# Patient Record
Sex: MALE | Race: OTHER | HISPANIC OR LATINO
[De-identification: names, ages, dates, MRNs, and addresses within clinical notes are randomized per-mention and may not be internally consistent; named-entity substitution may affect disease eponyms.]

---

## 2022-08-16 ENCOUNTER — APPOINTMENT (OUTPATIENT)
Dept: WOUND CARE | Facility: CLINIC | Age: 52
End: 2022-08-16

## 2022-08-16 ENCOUNTER — OUTPATIENT (OUTPATIENT)
Dept: OUTPATIENT SERVICES | Facility: HOSPITAL | Age: 52
LOS: 1 days | End: 2022-08-16
Payer: COMMERCIAL

## 2022-08-16 VITALS
HEART RATE: 79 BPM | DIASTOLIC BLOOD PRESSURE: 92 MMHG | TEMPERATURE: 97.1 F | OXYGEN SATURATION: 99 % | WEIGHT: 255 LBS | RESPIRATION RATE: 18 BRPM | HEIGHT: 72 IN | SYSTOLIC BLOOD PRESSURE: 150 MMHG | BODY MASS INDEX: 34.54 KG/M2

## 2022-08-16 DIAGNOSIS — Z78.9 OTHER SPECIFIED HEALTH STATUS: ICD-10-CM

## 2022-08-16 DIAGNOSIS — S81.801A UNSPECIFIED OPEN WOUND, RIGHT LOWER LEG, INITIAL ENCOUNTER: ICD-10-CM

## 2022-08-16 DIAGNOSIS — Z82.49 FAMILY HISTORY OF ISCHEMIC HEART DISEASE AND OTHER DISEASES OF THE CIRCULATORY SYSTEM: ICD-10-CM

## 2022-08-16 DIAGNOSIS — Z72.89 OTHER PROBLEMS RELATED TO LIFESTYLE: ICD-10-CM

## 2022-08-16 DIAGNOSIS — K76.9 LIVER DISEASE, UNSPECIFIED: ICD-10-CM

## 2022-08-16 DIAGNOSIS — Z00.00 ENCOUNTER FOR GENERAL ADULT MEDICAL EXAMINATION WITHOUT ABNORMAL FINDINGS: ICD-10-CM

## 2022-08-16 PROCEDURE — 11055 PARING/CUTG B9 HYPRKER LES 1: CPT

## 2022-08-16 PROCEDURE — G0463: CPT

## 2022-08-16 PROCEDURE — 11042 DBRDMT SUBQ TIS 1ST 20SQCM/<: CPT

## 2022-08-16 RX ORDER — SILVER 2" X 2"
0.9 BANDAGE TOPICAL
Qty: 1 | Refills: 0 | Status: ACTIVE | COMMUNITY
Start: 2022-08-16 | End: 1900-01-01

## 2022-08-17 DIAGNOSIS — L84 CORNS AND CALLOSITIES: ICD-10-CM

## 2022-08-17 DIAGNOSIS — L97.312 NON-PRESSURE CHRONIC ULCER OF RIGHT ANKLE WITH FAT LAYER EXPOSED: ICD-10-CM

## 2022-08-17 DIAGNOSIS — I87.2 VENOUS INSUFFICIENCY (CHRONIC) (PERIPHERAL): ICD-10-CM

## 2022-08-23 NOTE — HISTORY OF PRESENT ILLNESS
[FreeTextEntry1] : 52 year old male presents to clinic with a chief complaint of burning for the past 6 months to his left leg. Admits the burning has gotten worse the last 3 months. Pt states there is clear drainage that comes from his leg often with some wounds that have stayed the same for the last 3 months. Pt states he is not a diabetic and that he sees his cousin as his PCP who did some tests and states he has mild liver failure. Pt states he does not currently do anything to help his pain. Denies back pain at this time. Pain today is a 5/10. Denies any recent acute trauma. Denies any other pedal complaints at this time. Denies constitutional symptoms.

## 2022-08-23 NOTE — ASSESSMENT
[FreeTextEntry1] : Vascular: DP/PT pulses nonpalpable b/l; mild nonpitting edema noted to right lower extremity. Negative birch sign on right  \par Derm: Right lower leg wound noted to anterior medial calf measuring 7.0x4.0x.1cm with superficial granular base, mild erythema and serous drainage. Hyperkeratotic lesion noted to left hallux IPJ medially. \par Neuro: Protective sensation diminished b/l; light touch sensation present b/l \par MSK: Muscle strength 5/5 in all pedal quadrants b/l, Mild POP to right lower extremity, Equinus noted b/l \par \par Plan:\par - Pt seen and evaluated; discussed treatment plan with pt to full understanding via  (#[id number])\par - Pt's wound cleaned with chlorhexidine and saline \par - Debridement of right lower extremity wound down to level of subcutaneous tissue without incident with dermal curette \par - Dressed right lower extremity wound with iodosorb, 4x4, ABD, Kerlix, ACE\par - Encouraged pt to elevate B/l legs to decrease swelling\par - Referral to vascular due to venous stasis wounds and non-palpable pulses\par - Hyperkeratotic callous pared on left to patient satisfaction with #15 blade without incident \par - Rx for iodosorb sent to [facility] \par - 4x4, ABD, Kerlix, ACE supplies ordered to be sent to pt's house and pt to change dressing every 2-3 days \par -Pt educated on signs and symptoms of infection and recommended going to hospital if these symptoms arise\par - RTC in 2 weeks

## 2022-08-30 ENCOUNTER — APPOINTMENT (OUTPATIENT)
Dept: WOUND CARE | Facility: CLINIC | Age: 52
End: 2022-08-30

## 2022-08-30 ENCOUNTER — OUTPATIENT (OUTPATIENT)
Dept: OUTPATIENT SERVICES | Facility: HOSPITAL | Age: 52
LOS: 1 days | End: 2022-08-30
Payer: COMMERCIAL

## 2022-08-30 VITALS
DIASTOLIC BLOOD PRESSURE: 80 MMHG | HEART RATE: 69 BPM | TEMPERATURE: 97.2 F | BODY MASS INDEX: 32.78 KG/M2 | HEIGHT: 72 IN | SYSTOLIC BLOOD PRESSURE: 127 MMHG | RESPIRATION RATE: 18 BRPM | OXYGEN SATURATION: 98 % | WEIGHT: 242 LBS

## 2022-08-30 VITALS
OXYGEN SATURATION: 98 % | DIASTOLIC BLOOD PRESSURE: 76 MMHG | SYSTOLIC BLOOD PRESSURE: 158 MMHG | BODY MASS INDEX: 32.78 KG/M2 | TEMPERATURE: 97.5 F | HEART RATE: 97 BPM | WEIGHT: 242 LBS | RESPIRATION RATE: 18 BRPM | HEIGHT: 72 IN

## 2022-08-30 VITALS
WEIGHT: 255 LBS | TEMPERATURE: 97.2 F | HEIGHT: 72 IN | SYSTOLIC BLOOD PRESSURE: 142 MMHG | DIASTOLIC BLOOD PRESSURE: 80 MMHG | BODY MASS INDEX: 34.54 KG/M2 | HEART RATE: 79 BPM | OXYGEN SATURATION: 99 % | RESPIRATION RATE: 18 BRPM

## 2022-08-30 DIAGNOSIS — Z00.00 ENCOUNTER FOR GENERAL ADULT MEDICAL EXAMINATION WITHOUT ABNORMAL FINDINGS: ICD-10-CM

## 2022-08-30 PROCEDURE — G0463: CPT

## 2022-08-31 DIAGNOSIS — K76.9 LIVER DISEASE, UNSPECIFIED: ICD-10-CM

## 2022-08-31 DIAGNOSIS — L97.811 NON-PRESSURE CHRONIC ULCER OF OTHER PART OF RIGHT LOWER LEG LIMITED TO BREAKDOWN OF SKIN: ICD-10-CM

## 2022-09-08 NOTE — ASSESSMENT
[FreeTextEntry1] : Vascular: DP/PT pulses palpable b/l; no edema, no erythema, pedal hair present\par Derm: Right lower leg healed wounds, cover circumference of the distal leg. Hyperkeratotic lesion noted to left hallux IPJ medially\par Neuro: protective sensation intact\par MSK: muscle strength 5/5 in all compartments\par \par Plan:\par - Pt seen and evaluated\par - Healed wounds cleaned with chlorhexidine and sterile saline\par - Dressed right lower extremity wound with iodosorb, 4x4, ABD, Reba, ACE\par - Encouraged pt to continue wound care\par - RTC in 2 weeks

## 2022-09-08 NOTE — HISTORY OF PRESENT ILLNESS
[FreeTextEntry1] : 52 year old male presents to clinic for a follow-up visit. Patient reports his right leg is feeling much better. The swelling has calmed down. The wounds have closed and are not painful. Patient reports that his cousin who is a PCP continues to change his dressings and puts a cream on, patient does not know the name of the cream. Patient reports he stopped drinking alcohol. Patient reports that he had liver failure in the past according to his PCP, his PCP is monitoring his blood tests for his liver. Denies pain, denies any recent acute trauma. Denies any other pedal complaints at this time. Denies constitutional symptoms.

## 2022-09-13 ENCOUNTER — APPOINTMENT (OUTPATIENT)
Dept: WOUND CARE | Facility: CLINIC | Age: 52
End: 2022-09-13

## 2022-09-13 ENCOUNTER — OUTPATIENT (OUTPATIENT)
Dept: OUTPATIENT SERVICES | Facility: HOSPITAL | Age: 52
LOS: 1 days | End: 2022-09-13
Payer: COMMERCIAL

## 2022-09-13 VITALS
HEIGHT: 72 IN | OXYGEN SATURATION: 98 % | DIASTOLIC BLOOD PRESSURE: 83 MMHG | TEMPERATURE: 97.5 F | RESPIRATION RATE: 18 BRPM | WEIGHT: 240 LBS | HEART RATE: 67 BPM | BODY MASS INDEX: 32.51 KG/M2 | SYSTOLIC BLOOD PRESSURE: 125 MMHG

## 2022-09-13 DIAGNOSIS — Z00.00 ENCOUNTER FOR GENERAL ADULT MEDICAL EXAMINATION WITHOUT ABNORMAL FINDINGS: ICD-10-CM

## 2022-09-13 PROCEDURE — G0463: CPT

## 2022-09-13 NOTE — HISTORY OF PRESENT ILLNESS
[FreeTextEntry1] : 52 year old male presents to clinic for a follow-up visit. Patient ambulates to clinic in [facility]. Patient reports that his wounds remain healed. He only has a complaint of mild right heel pain. He does not have swelling at the extremities. Patient reports that he had liver failure in the past according to his cousin who is also his PCP. His PCP is monitoring his liver. Denies pain, denies any recent acute trauma. Denies any other pedal complaints at this time. Denies constitutional symptoms.

## 2022-09-13 NOTE — ASSESSMENT
[FreeTextEntry1] : Vascular: DP/PT pulses palpable b/l; no edema, no erythema, pedal hair present\par Derm: Right lower leg healed wounds, cover circumference of the distal leg. Hyperkeratotic lesion noted to left hallux IPJ medially\par Neuro: protective sensation intact\par MSK: mild right heel pain\par \par Plan:\par - Pt seen and evaluated\par - Patient reports he stopped putting dressings on his healed wounds\par - Encouraged patient to start wearing more supportive shoe gear\par - RTC in 3 weeks

## 2022-09-14 DIAGNOSIS — S81.801A UNSPECIFIED OPEN WOUND, RIGHT LOWER LEG, INITIAL ENCOUNTER: ICD-10-CM
